# Patient Record
Sex: MALE | Employment: FULL TIME | URBAN - METROPOLITAN AREA
[De-identification: names, ages, dates, MRNs, and addresses within clinical notes are randomized per-mention and may not be internally consistent; named-entity substitution may affect disease eponyms.]

---

## 2019-08-07 ENCOUNTER — TRANSFERRED RECORDS (OUTPATIENT)
Dept: HEALTH INFORMATION MANAGEMENT | Facility: CLINIC | Age: 39
End: 2019-08-07

## 2019-08-20 ENCOUNTER — TELEPHONE (OUTPATIENT)
Dept: UROLOGY | Facility: CLINIC | Age: 39
End: 2019-08-20

## 2019-08-21 NOTE — TELEPHONE ENCOUNTER
[facility] Call Center    Phone Message    May a detailed message be left on voicemail: yes    Reason for Call: Other: Pt had analysis 8/7/2019 and was wondering if that report is acceptable for his 8/23 consult visit. Please contact Pt if  that is not acceptable so that orders can be placed to complete a new analysis. Thank you     Action Taken: Message routed to:  [facility] ([facility]): urology

## 2019-08-21 NOTE — TELEPHONE ENCOUNTER
MEDICAL RECORDS REQUEST   [facility]  Urology Clinic  [address] [place] [address]  PHONE: [phone]  Fax: [phone]        FUTURE VISIT INFORMATION                                                   Karina Scales, : 1980 scheduled for future visit at [facility]    APPOINTMENT INFORMATION:    Date: 19 8:40AM     Provider:  Emmanuel Harrison MD     Reason for Visit/Diagnosis: Infertility     REFERRAL INFORMATION:    Referring provider: Self    Specialty: N/A    Referring providers clinic:  N/A     Clinic contact number:  N/A     RECORDS REQUESTED FOR VISIT                                                     NOTES  STATUS/DETAILS   OFFICE NOTE from referring provider  no   OFFICE NOTE from other specialist  yes   DISCHARGE SUMMARY from hospital  no   DISCHARGE REPORT from the ER  no   OPERATIVE REPORT  no   MEDICATION LIST  no   INFERTILITY     ALBUMIN  no   FSH  no   LAST UROLOGY/OB GYN VISIT NOTE  in process   LH  no   SEMEN ANALYSIS (LAST 2)  in process   SHBG  no   T  no     PRE-VISIT CHECKLIST      Record collection complete If no, please explain: Called and spoke with pt to have recs faxed -   Appointment appropriately scheduled           (right time/right provider) Yes   MyChart activation If no, please explain: In process    Questionnaire complete If no, please explain: In process      Completed by: Diane Cruz

## 2019-08-22 NOTE — TELEPHONE ENCOUNTER
Called patient and let message  Labs are here and ok for his appointment Jaye Rudd LPN Staff Nurse

## 2019-08-23 ENCOUNTER — PRE VISIT (OUTPATIENT)
Dept: UROLOGY | Facility: CLINIC | Age: 39
End: 2019-08-23

## 2019-08-23 ENCOUNTER — OFFICE VISIT (OUTPATIENT)
Dept: UROLOGY | Facility: CLINIC | Age: 39
End: 2019-08-23
Payer: COMMERCIAL

## 2019-08-23 ENCOUNTER — APPOINTMENT (OUTPATIENT)
Dept: LAB | Facility: CLINIC | Age: 39
End: 2019-08-23
Payer: COMMERCIAL

## 2019-08-23 VITALS
DIASTOLIC BLOOD PRESSURE: 69 MMHG | HEART RATE: 59 BPM | SYSTOLIC BLOOD PRESSURE: 124 MMHG | BODY MASS INDEX: 26.85 KG/M2 | HEIGHT: 71 IN | WEIGHT: 191.8 LBS

## 2019-08-23 DIAGNOSIS — E29.1 TESTICULAR HYPOFUNCTION: ICD-10-CM

## 2019-08-23 DIAGNOSIS — R86.8 TERATOSPERMIA: Primary | ICD-10-CM

## 2019-08-23 PROBLEM — M54.50 LOW BACK PAIN, NON-SPECIFIC: Status: ACTIVE | Noted: 2018-02-01

## 2019-08-23 PROBLEM — Z83.3 FAMILY HISTORY OF DIABETES MELLITUS: Status: ACTIVE | Noted: 2018-06-07

## 2019-08-23 LAB — FSH SERPL-ACNC: 4.4 IU/L (ref 0.7–10.8)

## 2019-08-23 ASSESSMENT — PAIN SCALES - GENERAL: PAINLEVEL: NO PAIN (0)

## 2019-08-23 ASSESSMENT — PATIENT HEALTH QUESTIONNAIRE - PHQ9
SUM OF ALL RESPONSES TO PHQ QUESTIONS 1-9: 3
SUM OF ALL RESPONSES TO PHQ QUESTIONS 1-9: 3

## 2019-08-23 ASSESSMENT — MIFFLIN-ST. JEOR: SCORE: 1822.13

## 2019-08-23 NOTE — NURSING NOTE
"Chief Complaint   Patient presents with     Consult     Fertility discussion       Blood pressure 124/69, pulse 59, height 1.803 m (5' 11\"), weight 87 kg (191 lb 12.8 oz). Body mass index is 26.75 kg/m .    Patient Active Problem List   Diagnosis     Low back pain, non-specific     Impingement syndrome of left shoulder     Family history of diabetes mellitus       No Known Allergies    No current outpatient medications on file.       Social History     Tobacco Use     Smoking status: Never Smoker     Smokeless tobacco: Never Used   Substance Use Topics     Alcohol use: None     Drug use: None       Cass Bustos LPN, LPN  8/23/2019  8:40 AM  "

## 2019-08-23 NOTE — PROGRESS NOTES
"Dear Dr. Jarvis, it was my pleasure to see Mr. Karina Scales, a 38 year old male here in consultation today for fertility evaluation.  His spouse is Allison age 34 (9/18/84).    This couple has been attempting to conceive for the last 6mo. They have no previous pregnancy together.  Pregnancies with other partners: none for either.  They have tried timed intercourse. They have not tried IUI or IVF.    Female factors suspected: + for PCOS.  Cycles are irregular.    PAST MEDICAL HISTORY:    Past Medical History:   Diagnosis Date     Family history of diabetes mellitus      Low back pain       Puberty normal   No associated conditions such as ED or sexual dysfunction.  No  problems.     PAST SURG HISTORY  Past Surgical History:   Procedure Laterality Date     NO HISTORY OF SURGERY          Medications as of 8/23/2019:  No prescription medications       ALLERGY:   No Known Allergies    SOCIAL HISTORY:   . Occupation: IT.  No alcohol abuse, no tobacco use.   Social History     Tobacco Use     Smoking status: Never Smoker     Smokeless tobacco: Never Used   Substance Use Topics     Alcohol use: None     Drug use: None         FAMILY HISTORY: No inherited disorders..   History reviewed. No pertinent family history.    REVIEW OF SYSTEMS:  Significant for feeling well at present .    Denies erectile dysfunction, ejaculatory problems, testicular pain. No vision or smell deficits, no chronic sinus or respiratory infections. No recent febrile illness, weight loss. No heat or cold intolerance, gynecomastia, or other endocrine complaints.    Otherwise, no constitutional, eye, ENT, heart, lung, GI , musculoskeletal, skin, neurologic, psychiatric, or hematologic complaints.    GONADOTOXIN EXPOSURE: Unremarkable. Otherwise negative for marijuana, heat, chemicals, pesticides, heavy metals, steroids, chemotherapy or radiation.    GENERAL PHYSICAL EXAM  /69   Pulse 59   Ht 1.803 m (5' 11\")   Wt 87 kg (191 lb 12.8 " oz)   BMI 26.75 kg/m     Constitutional: No acute distress. Well nourished.   PSYCH: normal mood and affect.  NEURO: normal gait, no focal deficits.   EYES: anicteric, EOMI, PERR  ENT: neck supple,  mucosae moist, no thrush.  CARDIOPULMONARY: breathing non-labored, pulse regular, no peripheral edema.  GI: Abdomen soft, non-tender, no surgical scars, no organomegaly.  MUSCULOSKELETAL: normal limb proportions, no muscle wasting, no contractures.  SKIN: Normal virilized hair distribution, no lesions, warts or rashes over genitalia, abdomen extremities or face.  HEME/LYMPH: no ecchymosis, no lymphadenopathy in groin or neck, no lymphedema.     EXAM:  Phallus uncircumcised, meatus adequate, no plaques palpated.   Left testis descended , size is 25 cc , consistency is normal . No intra-testicular masses.   Right testis descended , size is 25 cc , consistency is normal . No intra-testicular masses.   Epididymes present, non-tender, not enlarged.   Left cord: Vas present. No varicocele noted.  Right cord: Vas present. No varicocele noted.   Scrotum is a little tight but no obvious varicoceles.  Rectal exam deferred.     Semen Analysis 8/7/19:  2.9ml,  pH 7.6, 42.5M/cc, 89% motile, 2% morphology (>3%), Total Progressive Motile Count: 109.7 Million.       Component      Latest Ref Rng & Units 8/23/2019   Testosterone Total      240 - 950 ng/dL 332   Sex Hormone Binding Globulin      11 - 80 nmol/L 21   Free Testosterone Calculated      4.7 - 24.4 ng/dL 8.35   FSH      0.7 - 10.8 IU/L 4.4   Estradiol Ultrasensitive      10 - 40 pg/mL 18       He had a previous SA that showed similar, just isolated teratospermia.  I don't have these results.    ASSESSMENT:    Fertility Testing.    Testicular hypofunction: teratospermia.    No varicocele noted    PLAN:    Hormonal panel- ordered.    SA x 2 done.    Discussed OTC supplements to consider taking    I will contact him with results and plan/options.      Thank-you for allowing me  to care for your patient.  Sincerely,    Emmanuel Harrison MD      CC: Dr. Jarvis

## 2019-08-23 NOTE — LETTER
8/23/2019       RE: Karina Scales  [address]  [facility] [address]     Dear Colleague,    Thank you for referring your patient, Karina Scales, to the [facility] UROLOGY AND INST FOR PROSTATE AND UROLOGIC CANCERS at [facility]. Please see a copy of my visit note below.    Dear Dr. Jarvis, it was my pleasure to see Mr. Karina Scales, a 38 year old male here in consultation today for fertility evaluation.  His spouse is Allison age 34 (9/18/84).    This couple has been attempting to conceive for the last 6mo. They have no previous pregnancy together.  Pregnancies with other partners: none for either.  They have tried timed intercourse. They have not tried IUI or IVF.    Female factors suspected: + for PCOS.  Cycles are irregular.    PAST MEDICAL HISTORY:    Past Medical History:   Diagnosis Date     Family history of diabetes mellitus      Low back pain       Puberty normal   No associated conditions such as ED or sexual dysfunction.  No  problems.     PAST SURG HISTORY  Past Surgical History:   Procedure Laterality Date     NO HISTORY OF SURGERY          Medications as of 8/23/2019:  No prescription medications       ALLERGY:   No Known Allergies    SOCIAL HISTORY:   . Occupation: IT.  No alcohol abuse, no tobacco use.   Social History     Tobacco Use     Smoking status: Never Smoker     Smokeless tobacco: Never Used   Substance Use Topics     Alcohol use: None     Drug use: None         FAMILY HISTORY: No inherited disorders..   History reviewed. No pertinent family history.    REVIEW OF SYSTEMS:  Significant for feeling well at present .    Denies erectile dysfunction, ejaculatory problems, testicular pain. No vision or smell deficits, no chronic sinus or respiratory infections. No recent febrile illness, weight loss. No heat or cold intolerance, gynecomastia, or other endocrine complaints.    Otherwise, no constitutional, eye, ENT, heart,  "lung, GI , musculoskeletal, skin, neurologic, psychiatric, or hematologic complaints.    GONADOTOXIN EXPOSURE: Unremarkable. Otherwise negative for marijuana, heat, chemicals, pesticides, heavy metals, steroids, chemotherapy or radiation.    GENERAL PHYSICAL EXAM  /69   Pulse 59   Ht 1.803 m (5' 11\")   Wt 87 kg (191 lb 12.8 oz)   BMI 26.75 kg/m      Constitutional: No acute distress. Well nourished.   PSYCH: normal mood and affect.  NEURO: normal gait, no focal deficits.   EYES: anicteric, EOMI, PERR  ENT: neck supple,  mucosae moist, no thrush.  CARDIOPULMONARY: breathing non-labored, pulse regular, no peripheral edema.  GI: Abdomen soft, non-tender, no surgical scars, no organomegaly.  MUSCULOSKELETAL: normal limb proportions, no muscle wasting, no contractures.  SKIN: Normal virilized hair distribution, no lesions, warts or rashes over genitalia, abdomen extremities or face.  HEME/LYMPH: no ecchymosis, no lymphadenopathy in groin or neck, no lymphedema.     EXAM:  Phallus uncircumcised, meatus adequate, no plaques palpated.   Left testis descended , size is 25 cc , consistency is normal . No intra-testicular masses.   Right testis descended , size is 25 cc , consistency is normal . No intra-testicular masses.   Epididymes present, non-tender, not enlarged.   Left cord: Vas present. No varicocele noted.  Right cord: Vas present. No varicocele noted.   Scrotum is a little tight but no obvious varicoceles.  Rectal exam deferred.     Semen Analysis 8/7/19:  2.9ml,  pH 7.6, 42.5M/cc, 89% motile, 2% morphology (>3%), Total Progressive Motile Count: 109.7 Million.       Component      Latest Ref Rng & Units 8/23/2019   Testosterone Total      240 - 950 ng/dL 332   Sex Hormone Binding Globulin      11 - 80 nmol/L 21   Free Testosterone Calculated      4.7 - 24.4 ng/dL 8.35   FSH      0.7 - 10.8 IU/L 4.4   Estradiol Ultrasensitive      10 - 40 pg/mL 18       He had a previous SA that showed similar, just " isolated teratospermia.  I don't have these results.    ASSESSMENT:    Fertility Testing.    Testicular hypofunction: teratospermia.    No varicocele noted    PLAN:    Hormonal panel- ordered.    SA x 2 done.    Discussed OTC supplements to consider taking    I will contact him with results and plan/options.      Thank-you for allowing me to care for your patient.  Sincerely,    Emmanuel Harrison MD      CC: Dr. Jarvis    Again, thank you for allowing me to participate in the care of your patient.      Sincerely,    Emmanuel Harrison MD

## 2019-08-24 LAB
SHBG SERPL-SCNC: 21 NMOL/L (ref 11–80)
TESTOST FREE SERPL-MCNC: 8.35 NG/DL (ref 4.7–24.4)
TESTOST SERPL-MCNC: 332 NG/DL (ref 240–950)

## 2019-08-27 LAB — ESTRADIOL SERPL HS-MCNC: 18 PG/ML (ref 10–40)

## 2019-08-30 NOTE — RESULT ENCOUNTER NOTE
Dear Karina,   Here are your recent results.     Blood labs are all normal, no concerns.    Calculated free ( active) testosterone is 8.35 ng/dL ( >6.5 is normal), so testosterone level looks great.   There is a normal testosterone to estrogen ratio.  FSH is the signal from the brain to the testicles to drive sperm production.  Your FSH level is normal, I prefer to see this under 7.5 or so.    Thank You  Let me know if you have any questions.    Yobany HAWKINS